# Patient Record
Sex: FEMALE | Race: BLACK OR AFRICAN AMERICAN | NOT HISPANIC OR LATINO | Employment: FULL TIME | URBAN - NONMETROPOLITAN AREA
[De-identification: names, ages, dates, MRNs, and addresses within clinical notes are randomized per-mention and may not be internally consistent; named-entity substitution may affect disease eponyms.]

---

## 2020-12-04 DIAGNOSIS — Z01.84 ANTIBODY RESPONSE EXAMINATION: ICD-10-CM

## 2021-01-28 DIAGNOSIS — M54.42 LUMBAGO WITH SCIATICA, LEFT SIDE: Primary | ICD-10-CM

## 2021-01-29 ENCOUNTER — HOSPITAL ENCOUNTER (OUTPATIENT)
Dept: RADIOLOGY | Facility: HOSPITAL | Age: 76
Discharge: HOME | End: 2021-01-29
Attending: INTERNAL MEDICINE
Payer: MEDICARE

## 2021-01-29 DIAGNOSIS — M54.42 LUMBAGO WITH SCIATICA, LEFT SIDE: ICD-10-CM

## 2021-01-29 PROCEDURE — 72148 MRI LUMBAR SPINE W/O DYE: CPT | Mod: TC

## 2021-02-04 ENCOUNTER — HOSPITAL ENCOUNTER (OUTPATIENT)
Dept: RADIOLOGY | Facility: HOSPITAL | Age: 76
Discharge: HOME | End: 2021-02-04
Attending: INTERNAL MEDICINE
Payer: MEDICARE

## 2021-02-04 DIAGNOSIS — N28.1 RENAL CYST: ICD-10-CM

## 2021-02-04 PROCEDURE — 76770 US EXAM ABDO BACK WALL COMP: CPT | Mod: TC

## 2021-02-08 PROBLEM — M72.2 PLANTAR FASCIITIS: Status: ACTIVE | Noted: 2021-02-08

## 2021-02-08 PROBLEM — E78.5 HYPERLIPEMIA: Status: ACTIVE | Noted: 2021-02-08

## 2021-02-08 PROBLEM — E11.9 DIABETES MELLITUS WITHOUT COMPLICATION: Status: ACTIVE | Noted: 2021-02-08

## 2021-02-08 PROBLEM — E66.9 OBESITY: Status: ACTIVE | Noted: 2021-02-08

## 2021-02-08 PROBLEM — E88.810 METABOLIC SYNDROME: Status: ACTIVE | Noted: 2021-02-08

## 2021-02-08 PROBLEM — I10 ESSENTIAL HYPERTENSION: Status: ACTIVE | Noted: 2021-02-08

## 2021-02-08 PROBLEM — R53.83 FATIGUE: Status: ACTIVE | Noted: 2021-02-08

## 2021-02-08 PROBLEM — M54.50 LUMBAGO: Status: ACTIVE | Noted: 2021-02-08

## 2021-02-08 PROBLEM — B02.9 SHINGLES: Status: ACTIVE | Noted: 2021-02-08

## 2021-02-08 PROBLEM — E63.0 ESSENTIAL FATTY ACID (EFA) DEFICIENCY: Status: ACTIVE | Noted: 2021-02-08

## 2021-02-08 PROBLEM — M12.9 ARTHROPATHY: Status: ACTIVE | Noted: 2021-02-08

## 2021-02-08 PROBLEM — E88.89 COENZYME Q DEFICIENCY: Status: ACTIVE | Noted: 2021-02-08

## 2021-02-09 ENCOUNTER — IMMUNIZATION (OUTPATIENT)
Dept: OBSTETRICS AND GYNECOLOGY | Facility: CLINIC | Age: 76
End: 2021-02-09
Payer: MEDICARE

## 2021-02-09 DIAGNOSIS — Z23 NEED FOR VACCINATION: Primary | ICD-10-CM

## 2021-02-09 PROBLEM — N28.1 RENAL CYST: Status: ACTIVE | Noted: 2021-02-09

## 2021-02-09 PROBLEM — G47.00 INSOMNIA: Status: ACTIVE | Noted: 2021-02-09

## 2021-02-09 PROBLEM — M54.16 LUMBAR RADICULOPATHY: Status: ACTIVE | Noted: 2021-02-08

## 2021-02-09 PROCEDURE — 91300 COVID-19, MRNA, LNP-S, PF, 30 MCG/0.3 ML DOSE VACCINE: CPT | Mod: PBBFAC | Performed by: ANESTHESIOLOGY

## 2021-03-02 ENCOUNTER — IMMUNIZATION (OUTPATIENT)
Dept: OBSTETRICS AND GYNECOLOGY | Facility: CLINIC | Age: 76
End: 2021-03-02
Payer: MEDICARE

## 2021-03-02 DIAGNOSIS — Z23 NEED FOR VACCINATION: Primary | ICD-10-CM

## 2021-03-02 PROCEDURE — 0002A COVID-19, MRNA, LNP-S, PF, 30 MCG/0.3 ML DOSE VACCINE: CPT | Mod: CV19,,, | Performed by: ANESTHESIOLOGY

## 2021-03-02 PROCEDURE — 0002A COVID-19, MRNA, LNP-S, PF, 30 MCG/0.3 ML DOSE VACCINE: ICD-10-PCS | Mod: CV19,,, | Performed by: ANESTHESIOLOGY

## 2021-03-02 PROCEDURE — 91300 COVID-19, MRNA, LNP-S, PF, 30 MCG/0.3 ML DOSE VACCINE: CPT | Mod: ,,, | Performed by: ANESTHESIOLOGY

## 2021-03-02 PROCEDURE — 91300 COVID-19, MRNA, LNP-S, PF, 30 MCG/0.3 ML DOSE VACCINE: ICD-10-PCS | Mod: ,,, | Performed by: ANESTHESIOLOGY

## 2021-05-20 ENCOUNTER — HOSPITAL ENCOUNTER (OUTPATIENT)
Dept: RADIOLOGY | Facility: HOSPITAL | Age: 76
Discharge: HOME | End: 2021-05-20
Attending: INTERNAL MEDICINE
Payer: MEDICARE

## 2021-05-20 DIAGNOSIS — M25.531 RIGHT WRIST PAIN: ICD-10-CM

## 2021-05-20 PROCEDURE — 73110 X-RAY EXAM OF WRIST: CPT | Mod: TC,RT

## 2021-06-04 LAB — HBA1C MFR BLD: 6.8 % (ref 4–6)

## 2021-06-29 PROBLEM — Z00.00 ROUTINE GENERAL MEDICAL EXAMINATION AT A HEALTH CARE FACILITY: Status: ACTIVE | Noted: 2021-06-29

## 2021-07-01 ENCOUNTER — PATIENT OUTREACH (OUTPATIENT)
Dept: ADMINISTRATIVE | Facility: HOSPITAL | Age: 76
End: 2021-07-01

## 2021-10-04 PROBLEM — Z00.00 ROUTINE GENERAL MEDICAL EXAMINATION AT A HEALTH CARE FACILITY: Status: RESOLVED | Noted: 2021-06-29 | Resolved: 2021-10-04

## 2022-05-04 PROBLEM — E55.9 VITAMIN D DEFICIENCY: Status: ACTIVE | Noted: 2022-05-04

## 2023-01-03 PROBLEM — J06.9 URI (UPPER RESPIRATORY INFECTION): Status: ACTIVE | Noted: 2023-01-03

## 2023-01-27 PROBLEM — Z00.00 WELLNESS EXAMINATION: Status: ACTIVE | Noted: 2023-01-27

## 2023-01-30 PROBLEM — M54.32 LEFT SCIATIC NERVE PAIN: Status: ACTIVE | Noted: 2023-01-30

## 2023-01-30 PROBLEM — J06.9 URI (UPPER RESPIRATORY INFECTION): Status: RESOLVED | Noted: 2023-01-03 | Resolved: 2023-01-30

## 2023-02-10 PROBLEM — M25.552 LEFT HIP PAIN: Status: RESOLVED | Noted: 2023-02-10 | Resolved: 2023-02-10

## 2023-02-10 PROBLEM — M25.552 LEFT HIP PAIN: Status: ACTIVE | Noted: 2023-02-10

## 2023-03-15 ENCOUNTER — CLINICAL SUPPORT (OUTPATIENT)
Dept: REHABILITATION | Facility: HOSPITAL | Age: 78
End: 2023-03-15
Attending: INTERNAL MEDICINE
Payer: MEDICARE

## 2023-03-15 DIAGNOSIS — M25.552 LEFT HIP PAIN: ICD-10-CM

## 2023-03-15 PROCEDURE — 97161 PT EVAL LOW COMPLEX 20 MIN: CPT

## 2023-03-15 NOTE — PLAN OF CARE
Physical Therapy Initial Evaluation     Name: Jaki [facility] Number: [id number]    Diagnosis:   Encounter Diagnosis   Name Primary?    Left hip pain      Physician: Jung Manjarrez Jr., *  Treatment Orders: PT Eval and Treat  Past Medical History:   Diagnosis Date    Diabetes     Hyperlipidemia     Hypertension     Obesity     Postherpetic neuralgia      Current Outpatient Medications   Medication Sig    amLODIPine (NORVASC) 5 MG tablet Take 1 tablet (5 mg total) by mouth once daily.    aspirin (ECOTRIN) 81 MG EC tablet Take 81 mg by mouth once daily.    dulaglutide (TRULICITY) 4.5 mg/0.5 mL pen injector Inject 4.5 mg into the skin every 7 days.    gabapentin (NEURONTIN) 100 MG capsule Take 1 capsule (100 mg total) by mouth 3 (three) times daily.    metFORMIN (GLUCOPHAGE) 500 MG tablet Take 1 tablet (500 mg total) by mouth 2 (two) times daily with meals.    olmesartan (BENICAR) 40 MG tablet Take 1 tablet (40 mg total) by mouth once daily.    promethazine (PHENERGAN) 25 MG tablet Take 1 tablet (25 mg total) by mouth 2 (two) times daily as needed for Nausea.    rosuvastatin (CRESTOR) 5 MG tablet Take 1 tablet (5 mg total) by mouth once daily.    traMADoL (ULTRAM) 50 mg tablet Take 1 tablet (50 mg total) by mouth every 12 (twelve) hours as needed for Pain.    traZODone (DESYREL) 50 MG tablet Take 1 tablet (50 mg total) by mouth every evening.     No current facility-administered medications for this visit.     Review of patient's allergies indicates:   Allergen Reactions    Hydrocodone     Opioids - morphine analogues     Oxycodone        Subjective     Patient states:  history of (L) buttock/hip pain with onset of approximately 1 month. Patient reports no known injury and pain has gradually increased in intensity, duration and frequency requiring her to seek treatment. Patient does report fall 2-3 month ago due to tripping on broken sidewalk but no  residual pain symptoms or injury. Patient reports she has more difficulty sitting than standing and cannot even tolerate a ride from here to [place] due to pain symptoms. Patient currently reports (L) buttock/hip pain 8/10 sitting and 5/10 standing. 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 No steps or stairs to enter home.   Sciatic nerve trouble a couple of years ago. Lumbar surgery 8 years ago.          Pts goals:  to return to PLOF without pain symptoms    Objective     Posture Alignment: decreased lordosis, decreased weight bearing on (L) LE/hip in sitting/standing, toe out of (B) feet  Palpation: Tenderness to palpation (L) hip/buttocks and proximal aspect of hamstring    Lumbar Range of Motion: % of available ROM   Degrees   Flexion 75     Extension 25     Left Side Bending 50   Right Side Bending 50   Left rotation   50   Right Rotation   50        Lower Extremity Strength  Right LE  Left LE    Knee extension: 4+/5 Knee extension: 4/5   Knee flexion: 4+/5 Knee flexion: 4/5   Hip flexion: 4+/5 Hip flexion: 3-/5   Hip extension:  4+/5 Hip extension: 3-/5   Hip abduction: 4+/5 Hip abduction: 3-/5   Hip adduction: 4+/5 Hip adduction 3-/5   Ankle dorsiflexion: 4+/5 Ankle dorsiflexion: 4+/5   Ankle plantarflexion: 4+/5 Ankle plantarflexion: 4+/5      Lower Extremity ROM (guarding (L) hip during AROM and PROM)  Right LE  Left LE    Knee extension: WNL Knee extension: WNL   Knee flexion: WNL Knee flexion: WNL   Hip flexion: WNL Hip flexion: 75% normal ROM   Hip extension:  WNL Hip extension: 75% normal ROM   Hip abduction: WNL Hip abduction: 75% normal ROM   Hip adduction: WNL Hip adduction 75% normal ROM   Ankle dorsiflexion: WNL Ankle dorsiflexion: WNL   Ankle plantarflexion: WNL Ankle plantarflexion: WNL     Dermatomes: Sensation: Light Touch: Intact  Myotomes: Intact    FLEXIBILITY: decreased  hamstring and piriformis flexibility    Special Tests:   Left Right   Mal + -   Negrito + -   Power + -     GAIT: Jaki ambulates with no assistive device with independently.     GAIT DEVIATIONS: Jaki displays antalgic gait pattern with toe out, wide HAO, decreased heel strike, decreased foot clearance, decreased weight shifting to (L) LE,     Pt/family was provided educational information, including: role of PT, goals for PT, scheduling - pt verbalized understanding. Discussed insurance limitations with pt.     LOWER EXTREMITY FUNCTIONAL SCALE  26/80         1. Any of your usual work, housework or school activities     2. Your usual hobbies, sporting       3. Getting in and out of tub        4. Walking between rooms        5. Putting on shoes or socks        6. Squatting          7. Lifting an object from the ground        8. Performing light activities around the home     9. Performing heavy activities around the home     10. Getting in and out of car        11. Walking 2 blocks         12.Walking a mile         13. Getting up and down 1 flight of stairs      14. Standing for 1 hour        15. Sitting for an hour         16. Running on even ground        17. Running on uneven ground       18. Making sharp turns when running fast      19. Hopping          20. Rolling over in bed          5x sit to stand: 16s  TUs    TREATMENT     Time In: 1400  Time Out: 1445    PT Evaluation Completed? Yes  Discussed Plan of Care with patient: Yes    Written Home Exercises Provided: please see  for details  Jaki demo good understanding of the education provided. Patient demo good return demo of skill of exercises.    Assessment     Patient could benefit from PT intervention to treat above stated deficits.   Pt prognosis is Good.  Pt will benefit from skilled outpatient physical therapy to address the above stated deficits, provide  pt/family education and to maximize pt's level of independence.     Medical necessity is demonstrated by the following IMPAIRMENTS/PROBLEMS:  1. Increased Pain  2. Decreased ROM  3. Decreased Core & LLE strength  4. Decreased Flexibility BLE  5. Decreased Tolerance to Functional Activities    Pt's spiritual, cultural and educational needs considered and pt agreeable to plan of care and goals as stated below:     Anticipated Barriers for physical therapy: none    Short Term GOALS: 4 weeks. Pt agrees with goals set.  1. Patient demonstrates independence with HEP.   2. Patient demonstrates independence with Postural Awareness.   3. Patient demonstrates independence with body mechanics.     Long Term GOALS: 8 weeks. Pt agrees with goals set.  1. Patient demonstrates increased (L) hip ROM to WNL to improve tolerance to functional activities.   2. Patient demonstrates increased strength BLE's to 4/5 or greater to improve tolerance to functional activities.   3. Patient demonstrates improved overall function per LEFS to 50 or greater.   4. Patient demonstrates decreased (L) LE pain symptoms to 2/10 at worst.  5. Patient demonstrates improvement in flexibility (L) LE to facilitate performance of functional activities.   6. Patient demonstrates decreased gait deviations.  7. Goals PRN.     PLAN     Outpatient physical therapy 2 times weekly to include: pt ed, hep, therapeutic exercises, neuromuscular re-education/ balance exercises, manual therapy, and modalities prn including but not limited to MHP, CP, electrical stimulation, ultrasound. Cont PT for  8 weeks. Pt may be seen by PTA as part of the rehabilitation team.   Certification dates: 3/15/2023-5/12/2023    Therapist: Camila Dan, PT  3/15/2023       MD Certification     [] I certify that I have reviewed this PT Evaluation   and I am in agreement with the Plan of Care.     MD:     Date:

## 2023-03-20 ENCOUNTER — TELEPHONE (OUTPATIENT)
Dept: REHABILITATION | Facility: HOSPITAL | Age: 78
End: 2023-03-20
Payer: MEDICARE

## 2023-03-21 ENCOUNTER — CLINICAL SUPPORT (OUTPATIENT)
Dept: REHABILITATION | Facility: HOSPITAL | Age: 78
End: 2023-03-21
Payer: MEDICARE

## 2023-03-21 DIAGNOSIS — M25.552 LEFT HIP PAIN: Primary | ICD-10-CM

## 2023-03-21 PROCEDURE — 97112 NEUROMUSCULAR REEDUCATION: CPT

## 2023-03-21 PROCEDURE — 97110 THERAPEUTIC EXERCISES: CPT

## 2023-03-21 NOTE — PROGRESS NOTES
"                                                    Physical Therapy Daily Note     Name: Jaki [facility] Number: [id number]  Diagnosis:   Encounter Diagnosis   Name Primary?    Left hip pain Yes     Physician: Jung Manjarrez Jr., *  Precautions: fall  Visit #: 1 of 10  PTA Visit #: 0  Time In: 1350  Time Out: 1448    Subjective     Pt reports: (L) hip feeling better since initial evaluation. Patient reports she has been performing HEP.   Pain Scale: Jaki rates pain on a scale of 0-10 to be 5 currently. Decreased (L) hip pain following treatment.     Objective     Jaki received individual therapeutic exercises, activities and neuro re-education techniques to develop strength, endurance, ROM, flexibility, posture, and core stabilization for 58 minutes including:  MHP x10 minutes to (L) hip prior to treatment per patient request.   Omnicycle (B) LE ortho 8W 10' 72/28.  4x30" HSS  4x30" Figure 4 stretch  3x10 glute sets 3"  3x10 marching supine with TA bracing  3x10 ball flexion supine  3x10 bridge  3x10 Abduction Supine  3x10 supine clams RTB  3x10 HS curls YTB    Jaki received the following manual therapy techniques: none    The patient received the following direct contact modalities after being cleared for contraindications:none    The patient received the following supervised modalities after being cleared for contradictions: see above    Written Home Exercises Provided: see   Education provided re: modalities, progression of therapy program  Jaki verbalized good understanding of education provided.   No spiritual or educational barriers to learning provided    Assessment     Patient tolerated treatment well. PT will continue to progress patient treatment as appropriate. Patient has made improvement with (L) hip pain since beginning therapy treatment.   This is a 77 y.o. female referred to outpatient physical therapy and presents with a medical diagnosis of L hip " pain and demonstrates limitations as described in the problem list. Pt prognosis is Excellent. Pt will continue to benefit from skilled outpatient physical therapy to address the deficits listed in the problem list, provide pt/family education and to maximize pt's level of independence in the home and community environment.     Goals as follows:  Short Term GOALS: 4 weeks. Pt agrees with goals set.  1. Patient demonstrates independence with HEP.   2. Patient demonstrates independence with Postural Awareness.   3. Patient demonstrates independence with body mechanics.      Long Term GOALS: 8 weeks. Pt agrees with goals set.  1. Patient demonstrates increased (L) hip ROM to WNL to improve tolerance to functional activities.   2. Patient demonstrates increased strength BLE's to 4/5 or greater to improve tolerance to functional activities.   3. Patient demonstrates improved overall function per LEFS to 50 or greater.   4. Patient demonstrates decreased (L) LE pain symptoms to 2/10 at worst.  5. Patient demonstrates improvement in flexibility (L) LE to facilitate performance of functional activities.   6. Patient demonstrates decreased gait deviations.  7. Goals PRN.         Plan     Continue with established Plan of Care towards PT goals.    Therapist: Camila Dan, PT  3/21/2023

## 2023-03-23 ENCOUNTER — CLINICAL SUPPORT (OUTPATIENT)
Dept: REHABILITATION | Facility: HOSPITAL | Age: 78
End: 2023-03-23
Payer: MEDICARE

## 2023-03-23 DIAGNOSIS — M25.552 LEFT HIP PAIN: Primary | ICD-10-CM

## 2023-03-23 PROCEDURE — 97110 THERAPEUTIC EXERCISES: CPT

## 2023-03-23 PROCEDURE — 97112 NEUROMUSCULAR REEDUCATION: CPT

## 2023-03-23 NOTE — PROGRESS NOTES
"                                                    Physical Therapy Daily Note     Name: Jaki [facility] Number: [id number]  Diagnosis:   Encounter Diagnosis   Name Primary?    Left hip pain Yes     Physician: Jung Manjarrez Jr., *  Precautions: fall  Visit #: 2 of 10  PTA Visit #: 0  Time In: 1400  Time Out: 1450    Subjective     Pt reports: no new complaints Patient reports she has been performing HEP.   Pain Scale: Jaki rates pain on a scale of 0-10 to be 2 currently.     Objective     Jaki received individual therapeutic exercises, activities and neuro re-education techniques to develop strength, endurance, ROM, flexibility, posture, and core stabilization for 50 minutes including:  MHP x10 minutes to (L) hip prior to treatment per patient request.   (B) LE bicycle 5 minutes steady revolutions  4x30" HSS  4x30" Figure 4 stretch  3x10 glute sets 3"  3x10 marching supine with TA bracing  3x10 ball flexion supine  3x10 bridge  3x10 Abduction Supine  3x10 supine clams GTB  3x10 HS curls RTB  +3x10x3" ball squeezes  +3x10 LAQ    Jaki received the following manual therapy techniques: none    The patient received the following direct contact modalities after being cleared for contraindications:none    The patient received the following supervised modalities after being cleared for contradictions: see above    Written Home Exercises Provided: see   Education provided re: modalities, progression of therapy program  Jaki verbalized good understanding of education provided.   No spiritual or educational barriers to learning provided    Assessment     Patient tolerated treatment well. PT will continue to progress patient treatment as appropriate. Continued improvement with (L) hip pain. Decreased antalgic gait pattern noted.   This is a 77 y.o. female referred to outpatient physical therapy and presents with a medical diagnosis of L hip pain and demonstrates limitations " as described in the problem list. Pt prognosis is Excellent. Pt will continue to benefit from skilled outpatient physical therapy to address the deficits listed in the problem list, provide pt/family education and to maximize pt's level of independence in the home and community environment.     Goals as follows:  Short Term GOALS: 4 weeks. Pt agrees with goals set.  1. Patient demonstrates independence with HEP.   2. Patient demonstrates independence with Postural Awareness.   3. Patient demonstrates independence with body mechanics.      Long Term GOALS: 8 weeks. Pt agrees with goals set.  1. Patient demonstrates increased (L) hip ROM to WNL to improve tolerance to functional activities.   2. Patient demonstrates increased strength BLE's to 4/5 or greater to improve tolerance to functional activities.   3. Patient demonstrates improved overall function per LEFS to 50 or greater.   4. Patient demonstrates decreased (L) LE pain symptoms to 2/10 at worst.  5. Patient demonstrates improvement in flexibility (L) LE to facilitate performance of functional activities.   6. Patient demonstrates decreased gait deviations.  7. Goals PRN.         Plan     Continue with established Plan of Care towards PT goals.    Therapist: Camila Dan, PT  3/23/2023

## 2023-03-30 ENCOUNTER — CLINICAL SUPPORT (OUTPATIENT)
Dept: REHABILITATION | Facility: HOSPITAL | Age: 78
End: 2023-03-30
Payer: MEDICARE

## 2023-03-30 DIAGNOSIS — M25.552 LEFT HIP PAIN: Primary | ICD-10-CM

## 2023-03-30 PROCEDURE — 97530 THERAPEUTIC ACTIVITIES: CPT

## 2023-03-30 PROCEDURE — 97110 THERAPEUTIC EXERCISES: CPT

## 2023-03-30 NOTE — PROGRESS NOTES
"                                                    Physical Therapy Daily Note     Name: Jaki [facility] Number: [id number]  Diagnosis:   Encounter Diagnosis   Name Primary?    Left hip pain Yes     Physician: Jung Manjarrez Jr., *  Precautions: fall  Visit #: 3 of 10  PTA Visit #: 0  Time In: 1502  Time Out: 1555    Subjective     Pt reports: no new complaints Patient reports she has been performing HEP.   Pain Scale: Jaki rates pain on a scale of 0-10 to be 0 currently. Patient just reports stiffness (L) hip.     Objective     Jaki received individual therapeutic exercises, activities and neuro re-education techniques to develop strength, endurance, ROM, flexibility, posture, and core stabilization for 53 minutes including:  MHP x5 minutes to (L) hip prior to treatment per patient request.   (B) LE bicycle 10 minutes steady revolutions  4x30" HSS  4x30" Figure 4 stretch  3x10 glute sets 3"  3x10 marching supine with TA bracing  3x10 ball flexion supine  3x10 bridge with adduction  3x10 Abduction Supine  +3x10 SLR supine  3x10 supine clams GTB  3x10 HS curls RTB  3x10 LAQ  +2x10 sit to stand  +3x10 standing hip flexion    Jaki received the following manual therapy techniques: none    The patient received the following direct contact modalities after being cleared for contraindications:none    The patient received the following supervised modalities after being cleared for contradictions: see above    Written Home Exercises Provided: see   Education provided re: modalities, progression of therapy program  Jaki verbalized good understanding of education provided.   No spiritual or educational barriers to learning provided    Assessment     Patient tolerated treatment well. PT will continue to progress patient treatment as appropriate. Patient overall reporting improvement in intensity, frequency and duration of (L) hip pain symptoms. Patient reports decreased " stiffness/pain following treatment.     This is a 77 y.o. female referred to outpatient physical therapy and presents with a medical diagnosis of L hip pain and demonstrates limitations as described in the problem list. Pt prognosis is Excellent. Pt will continue to benefit from skilled outpatient physical therapy to address the deficits listed in the problem list, provide pt/family education and to maximize pt's level of independence in the home and community environment.     Goals as follows:  Short Term GOALS: 4 weeks. Pt agrees with goals set.  1. Patient demonstrates independence with HEP.   2. Patient demonstrates independence with Postural Awareness.   3. Patient demonstrates independence with body mechanics.      Long Term GOALS: 8 weeks. Pt agrees with goals set.  1. Patient demonstrates increased (L) hip ROM to WNL to improve tolerance to functional activities.   2. Patient demonstrates increased strength BLE's to 4/5 or greater to improve tolerance to functional activities.   3. Patient demonstrates improved overall function per LEFS to 50 or greater.   4. Patient demonstrates decreased (L) LE pain symptoms to 2/10 at worst.  5. Patient demonstrates improvement in flexibility (L) LE to facilitate performance of functional activities.   6. Patient demonstrates decreased gait deviations.  7. Goals PRN.         Plan     Continue with established Plan of Care towards PT goals.    Therapist: Camila Dan, PT  3/30/2023

## 2023-04-03 ENCOUNTER — TELEPHONE (OUTPATIENT)
Dept: REHABILITATION | Facility: HOSPITAL | Age: 78
End: 2023-04-03
Payer: MEDICARE

## 2023-04-04 ENCOUNTER — TELEPHONE (OUTPATIENT)
Dept: REHABILITATION | Facility: HOSPITAL | Age: 78
End: 2023-04-04
Payer: MEDICARE

## 2023-04-04 ENCOUNTER — CLINICAL SUPPORT (OUTPATIENT)
Dept: REHABILITATION | Facility: HOSPITAL | Age: 78
End: 2023-04-04
Payer: MEDICARE

## 2023-04-04 DIAGNOSIS — M25.552 LEFT HIP PAIN: Primary | ICD-10-CM

## 2023-04-04 PROCEDURE — 97530 THERAPEUTIC ACTIVITIES: CPT

## 2023-04-04 PROCEDURE — 97112 NEUROMUSCULAR REEDUCATION: CPT

## 2023-04-04 PROCEDURE — 97110 THERAPEUTIC EXERCISES: CPT

## 2023-04-04 NOTE — PROGRESS NOTES
"                                                    Physical Therapy Daily Note     Name: Jaki [facility] Number: [id number]  Diagnosis:   No diagnosis found.    Physician: Jung Manjarrez Jr., *  Precautions: fall  Visit #: 4 of 10  PTA Visit #: 0  Time In: 1500  Time Out: 1547    Subjective     Pt reports: no complaints of (L) hip pain. Patient reports "I really feel like this is working."   Pain Scale: Jaik rates pain on a scale of 0-10 to be 0 currently.     Objective     Jaki received individual therapeutic exercises, activities and neuro re-education techniques to develop strength, endurance, ROM, flexibility, posture, and core stabilization for 47 minutes including:    (B) LE bicycle R2 10 minutes steady revolutions  4x30" HSS  4x30" Figure 4 stretch  3x10 glute sets 3"  3x10 marching supine with TA bracing  3x10 ball flexion supine RSB  3x10 bridge with adduction  3x10 Abduction Supine  3x10 SLR supine 1/2#  3x10 supine clams GTB  3x10 HS curls RTB  3x10 LAQ 1/2#  3x10 sit to stand from mat no UE  +3x10 standing hip flexion/abduction/extension  +3x10 lateral steps with RTB    Jaki received the following manual therapy techniques: none    The patient received the following direct contact modalities after being cleared for contraindications:none    The patient received the following supervised modalities after being cleared for contradictions: see above    Written Home Exercises Provided: see   Education provided re: modalities, progression of therapy program  Jaki verbalized good understanding of education provided.   No spiritual or educational barriers to learning provided    Assessment     Patient responding well to PT treatment with decline in symptoms. PT will continue to progress with POC as appropriate. Patient reports compliance with HEP. No pain after treatment and patient declined need for modalities.     This is a 77 y.o. female referred to " outpatient physical therapy and presents with a medical diagnosis of L hip pain and demonstrates limitations as described in the problem list. Pt prognosis is Excellent. Pt will continue to benefit from skilled outpatient physical therapy to address the deficits listed in the problem list, provide pt/family education and to maximize pt's level of independence in the home and community environment.     Goals as follows:  Short Term GOALS: 4 weeks. Pt agrees with goals set.  1. Patient demonstrates independence with HEP.   2. Patient demonstrates independence with Postural Awareness.   3. Patient demonstrates independence with body mechanics.      Long Term GOALS: 8 weeks. Pt agrees with goals set.  1. Patient demonstrates increased (L) hip ROM to WNL to improve tolerance to functional activities.   2. Patient demonstrates increased strength BLE's to 4/5 or greater to improve tolerance to functional activities.   3. Patient demonstrates improved overall function per LEFS to 50 or greater.   4. Patient demonstrates decreased (L) LE pain symptoms to 2/10 at worst.  5. Patient demonstrates improvement in flexibility (L) LE to facilitate performance of functional activities.   6. Patient demonstrates decreased gait deviations.  7. Goals PRN.         Plan     Continue with established Plan of Care towards PT goals.    Therapist: Camila Dan, PT  4/4/2023

## 2023-04-05 NOTE — PROGRESS NOTES
"                                                    Physical Therapy Daily Note     Name: Jaki [facility] Number: [id number]  Diagnosis:   No diagnosis found.    Physician: Jung Manjarrez Jr., *  Precautions: fall  Visit #: 5 of 10  PTA Visit #: 0  Time In: 1501  Time Out: 1551    Subjective     Pt reports: pain free today. Patient reports she hasn't felt this good in "months and years". Only reporting complaints of posterior thigh pain when sitting in a car especially for prolonged periods.   Pain Scale: Jaki rates pain on a scale of 0-10 to be 0 currently.     Objective     Jaki received individual therapeutic exercises, activities and neuro re-education techniques to develop strength, endurance, ROM, flexibility, posture, and core stabilization for 50 minutes including:    (B) LE bicycle R2 10 minutes steady revolutions  4x30" HSS  4x30" Figure 4 stretch  3x10 glute sets 3"  3x10 marching supine with TA bracing  3x10 ball flexion supine RSB  3x10 bridge with adduction  3x10 Abduction Supine  3x10 SLR supine 1#  3x10 supine clams BTB  3x10 HS curls GTB  3x10 LAQ 1#  3x10 sit to stand from mat no UE  3x10 standing hip flexion/abduction/extension  3x10 lateral steps with RTB  +3x10 calf raises/toe raises    Jaki received the following manual therapy techniques: none    The patient received the following direct contact modalities after being cleared for contraindications:none    The patient received the following supervised modalities after being cleared for contradictions: see above    Written Home Exercises Provided: see   Education provided re: modalities, progression of therapy program  Jaki verbalized good understanding of education provided.   No spiritual or educational barriers to learning provided    Assessment     Patient tolerated treatment well. Significant decrease in pain symptoms since beginning therapy treatment. PT will re-assess patient next visit " and if patient continues to be pain free will discharge from PT intervention.     This is a 77 y.o. female referred to outpatient physical therapy and presents with a medical diagnosis of L hip pain and demonstrates limitations as described in the problem list. Pt prognosis is Excellent. Pt will continue to benefit from skilled outpatient physical therapy to address the deficits listed in the problem list, provide pt/family education and to maximize pt's level of independence in the home and community environment.     Goals as follows:  Short Term GOALS: 4 weeks. Pt agrees with goals set.  1. Patient demonstrates independence with HEP.   2. Patient demonstrates independence with Postural Awareness.   3. Patient demonstrates independence with body mechanics.      Long Term GOALS: 8 weeks. Pt agrees with goals set.  1. Patient demonstrates increased (L) hip ROM to WNL to improve tolerance to functional activities.   2. Patient demonstrates increased strength BLE's to 4/5 or greater to improve tolerance to functional activities.   3. Patient demonstrates improved overall function per LEFS to 50 or greater.   4. Patient demonstrates decreased (L) LE pain symptoms to 2/10 at worst.  5. Patient demonstrates improvement in flexibility (L) LE to facilitate performance of functional activities.   6. Patient demonstrates decreased gait deviations.  7. Goals PRN.         Plan     Re-assessment with probable discharge as long as no significant changes.     Therapist: Camila Dan, PT  4/5/2023

## 2023-04-06 ENCOUNTER — TELEPHONE (OUTPATIENT)
Dept: REHABILITATION | Facility: HOSPITAL | Age: 78
End: 2023-04-06
Payer: MEDICARE

## 2023-04-06 ENCOUNTER — CLINICAL SUPPORT (OUTPATIENT)
Dept: REHABILITATION | Facility: HOSPITAL | Age: 78
End: 2023-04-06
Payer: MEDICARE

## 2023-04-06 DIAGNOSIS — M25.552 LEFT HIP PAIN: Primary | ICD-10-CM

## 2023-04-06 PROCEDURE — 97110 THERAPEUTIC EXERCISES: CPT

## 2023-04-06 PROCEDURE — 97112 NEUROMUSCULAR REEDUCATION: CPT

## 2023-04-13 ENCOUNTER — CLINICAL SUPPORT (OUTPATIENT)
Dept: REHABILITATION | Facility: HOSPITAL | Age: 78
End: 2023-04-13
Payer: MEDICARE

## 2023-04-13 DIAGNOSIS — M25.552 LEFT HIP PAIN: Primary | ICD-10-CM

## 2023-04-13 PROCEDURE — 97530 THERAPEUTIC ACTIVITIES: CPT

## 2023-04-13 PROCEDURE — 97110 THERAPEUTIC EXERCISES: CPT

## 2023-04-13 NOTE — PROGRESS NOTES
"                                                    Physical Therapy Daily Note/Discharge Summary     Name: Jaki [facility] Number: [id number]  Diagnosis:   Encounter Diagnosis   Name Primary?    Left hip pain Yes       Physician: Jung Manjarrez Jr., *  Precautions: fall  Visit #: 6 of 10  PTA Visit #: 0  Time In: 1500  Time Out: 1552    Subjective     Pt reports: no pain reported. No other complaints reported. Patient compliant with HEP.   Pain Scale: Jaki rates pain on a scale of 0-10 to be 0 currently.     Objective     Jaki received individual therapeutic exercises, activities and neuro re-education techniques to develop strength, endurance, ROM, flexibility, posture, and core stabilization for 52 minutes including:    (B) LE bicycle R2 10 minutes steady revolutions  4x30" HSS  4x30" Figure 4 stretch  3x10 glute sets 3"  3x10 marching supine with TA bracing  3x10 ball flexion supine RSB  3x10 bridge with adduction  3x10 Abduction Supine  3x10 SLR supine 1#  3x10 supine clams BTB  3x10 HS curls GTB  3x10 LAQ 1#  3x10 sit to stand from mat no UE  3x10 standing hip flexion/abduction/extension 1#  3x10 lateral steps with GTB  3x10 calf raises/toe raises    Jaki received the following manual therapy techniques: none    The patient received the following direct contact modalities after being cleared for contraindications:none    The patient received the following supervised modalities after being cleared for contradictions: see above    Written Home Exercises Provided: see   Education provided re: modalities, progression of therapy program  Jaki verbalized good understanding of education provided.   No spiritual or educational barriers to learning provided    Lower Extremity Strength  Right LE   Left LE     Knee extension: 4+/5 Knee extension: 4+/5   Knee flexion: 4+/5 Knee flexion: 4+/5   Hip flexion: 4+/5 Hip flexion: 4+/5   Hip extension:  4+/5 Hip extension: 4+/5 "   Hip abduction: 4+/5 Hip abduction: 4+/5   Hip adduction: 4+/5 Hip adduction 4+/5   Ankle dorsiflexion: 4+/5 Ankle dorsiflexion: 4+/5   Ankle plantarflexion: 4+/5 Ankle plantarflexion: 4+/5      Lower Extremity ROM   Right LE   Left LE     Knee extension: WNL Knee extension: WNL   Knee flexion: WNL Knee flexion: WNL   Hip flexion: WNL Hip flexion: WNL   Hip extension:  WNL Hip extension: WNL   Hip abduction: WNL Hip abduction: WNL   Hip adduction: WNL Hip adduction WNL   Ankle dorsiflexion: WNL Ankle dorsiflexion: WNL   Ankle plantarflexion: WNL Ankle plantarflexion: WNL       LOWER EXTREMITY FUNCTIONAL SCALE  76/80         1. Any of your usual work, housework or school activities   4/4  2. Your usual hobbies, sporting     4/4  3. Getting in and out of tub      4/4  4. Walking between rooms      4/4  5. Putting on shoes or socks      4/4  6. Squatting        3/4  7. Lifting an object from the ground      4/4  8. Performing light activities around the home   4/4  9. Performing heavy activities around the home   4/4  10. Getting in and out of car      4/4  11. Walking 2 blocks       4/4  12.Walking a mile       4/4  13. Getting up and down 1 flight of stairs    4/4  14. Standing for 1 hour      4/4  15. Sitting for an hour       4/4  16. Running on even ground      4/4  17. Running on uneven ground     3/4  18. Making sharp turns when running fast    3/4  19. Hopping        3/4  20. Rolling over in bed      4/4     Assessment   Patient has made significant improvements in pain, ROM, strength and functional activity tolerance since beginning PT treatment. PT feels patient could be discharged from PT intervention with continued performance of HEP.     Short Term GOALS: 4 weeks. Pt agrees with goals set.  1. Patient demonstrates independence with HEP.  Met CB 4/13/2023  2. Patient demonstrates independence with Postural Awareness. Met CB 4/13/2023  3. Patient demonstrates independence with body mechanics. Met CB  4/13/2023     Long Term GOALS: 8 weeks. Pt agrees with goals set.  1. Patient demonstrates increased (L) hip ROM to WNL to improve tolerance to functional activities. Met CB 4/13/2023  2. Patient demonstrates increased strength BLE's to 4/5 or greater to improve tolerance to functional activities. Met CB 4/13/2023  3. Patient demonstrates improved overall function per LEFS to 50 or greater. Met CB 4/13/2023  4. Patient demonstrates decreased (L) LE pain symptoms to 2/10 at worst.Met CB 4/13/2023  5. Patient demonstrates improvement in flexibility (L) LE to facilitate performance of functional activities. Met CB 4/13/2023  6. Patient demonstrates decreased gait deviations.Met CB 4/13/2023  7. Goals PRN.         Plan     Discharge from PT due to achieving all goals at this time.     Therapist: Camila Dan, PT  4/13/2023

## 2023-05-01 PROBLEM — Z00.00 WELLNESS EXAMINATION: Status: RESOLVED | Noted: 2023-01-27 | Resolved: 2023-05-01

## 2023-10-12 ENCOUNTER — LAB VISIT (OUTPATIENT)
Dept: LAB | Facility: HOSPITAL | Age: 78
End: 2023-10-12
Attending: INTERNAL MEDICINE
Payer: MEDICARE

## 2023-10-12 DIAGNOSIS — E88.89 COENZYME Q DEFICIENCY: ICD-10-CM

## 2023-10-12 DIAGNOSIS — E88.810 METABOLIC SYNDROME: ICD-10-CM

## 2023-10-12 DIAGNOSIS — E11.9 DIABETES MELLITUS WITHOUT COMPLICATION: ICD-10-CM

## 2023-10-12 DIAGNOSIS — I10 ESSENTIAL HYPERTENSION: ICD-10-CM

## 2023-10-12 DIAGNOSIS — Z79.899 ENCOUNTER FOR LONG-TERM (CURRENT) USE OF OTHER MEDICATIONS: ICD-10-CM

## 2023-10-12 DIAGNOSIS — E63.0 ESSENTIAL FATTY ACID (EFA) DEFICIENCY: ICD-10-CM

## 2023-10-12 DIAGNOSIS — E55.9 VITAMIN D DEFICIENCY: ICD-10-CM

## 2023-10-12 DIAGNOSIS — E78.2 MIXED HYPERLIPIDEMIA: ICD-10-CM

## 2023-10-12 DIAGNOSIS — E78.5 HYPERLIPIDEMIA, UNSPECIFIED HYPERLIPIDEMIA TYPE: ICD-10-CM

## 2023-10-12 DIAGNOSIS — Z00.00 WELLNESS EXAMINATION: ICD-10-CM

## 2023-10-12 LAB
ALBUMIN SERPL BCP-MCNC: 4 G/DL (ref 3.5–5.2)
ALBUMIN/CREAT UR: 35.3 UG/MG (ref 0–30)
ALP SERPL-CCNC: 77 U/L (ref 55–135)
ALT SERPL W/O P-5'-P-CCNC: 35 U/L (ref 10–44)
ANION GAP SERPL CALC-SCNC: 2 MMOL/L (ref 3–11)
AST SERPL-CCNC: 18 U/L (ref 10–40)
BASOPHILS # BLD AUTO: 0.02 K/UL (ref 0–0.2)
BASOPHILS NFR BLD: 0.3 % (ref 0–1.9)
BILIRUB SERPL-MCNC: 0.6 MG/DL (ref 0.1–1)
BUN SERPL-MCNC: 18 MG/DL (ref 8–23)
CALCIUM SERPL-MCNC: 9.4 MG/DL (ref 8.7–10.5)
CHLORIDE SERPL-SCNC: 106 MMOL/L (ref 95–110)
CHOLEST SERPL-MCNC: 160 MG/DL (ref 120–199)
CHOLEST/HDLC SERPL: 1.9 {RATIO} (ref 2–5)
CO2 SERPL-SCNC: 30 MMOL/L (ref 23–29)
CREAT SERPL-MCNC: 0.9 MG/DL (ref 0.5–1.4)
CREAT UR-MCNC: 80.5 MG/DL (ref 15–325)
CRP SERPL-MCNC: <0.29 MG/DL (ref 0–0.75)
DIFFERENTIAL METHOD: NORMAL
EOSINOPHIL # BLD AUTO: 0.1 K/UL (ref 0–0.5)
EOSINOPHIL NFR BLD: 2.4 % (ref 0–8)
ERYTHROCYTE [DISTWIDTH] IN BLOOD BY AUTOMATED COUNT: 12.9 % (ref 11.5–14.5)
EST. GFR  (NO RACE VARIABLE): >60 ML/MIN/1.73 M^2
ESTIMATED AVG GLUCOSE: 128 MG/DL (ref 68–131)
FOLATE SERPL-MCNC: 23.3 NG/ML (ref 4–24)
GLUCOSE SERPL-MCNC: 115 MG/DL (ref 70–110)
HBA1C MFR BLD: 6.1 % (ref 4–5.6)
HCT VFR BLD AUTO: 44.7 % (ref 37–48.5)
HDLC SERPL-MCNC: 83 MG/DL (ref 40–75)
HDLC SERPL: 51.9 % (ref 20–50)
HGB BLD-MCNC: 14.4 G/DL (ref 12–16)
IMM GRANULOCYTES # BLD AUTO: 0.02 K/UL (ref 0–0.04)
IMM GRANULOCYTES NFR BLD AUTO: 0.3 % (ref 0–0.5)
LDLC SERPL CALC-MCNC: 50.2 MG/DL (ref 63–159)
LYMPHOCYTES # BLD AUTO: 2.2 K/UL (ref 1–4.8)
LYMPHOCYTES NFR BLD: 36.5 % (ref 18–48)
MAGNESIUM SERPL-MCNC: 1.9 MG/DL (ref 1.6–2.6)
MCH RBC QN AUTO: 30.1 PG (ref 27–31)
MCHC RBC AUTO-ENTMCNC: 32.2 G/DL (ref 32–36)
MCV RBC AUTO: 93 FL (ref 82–98)
MICROALBUMIN UR DL<=1MG/L-MCNC: 28.4 MG/L
MONOCYTES # BLD AUTO: 0.5 K/UL (ref 0.3–1)
MONOCYTES NFR BLD: 8.1 % (ref 4–15)
NEUTROPHILS # BLD AUTO: 3.1 K/UL (ref 1.8–7.7)
NEUTROPHILS NFR BLD: 52.4 % (ref 38–73)
NONHDLC SERPL-MCNC: 77 MG/DL
NRBC BLD-RTO: 0 /100 WBC
PLATELET # BLD AUTO: 237 K/UL (ref 150–450)
PMV BLD AUTO: 9.7 FL (ref 9.2–12.9)
POTASSIUM SERPL-SCNC: 4.4 MMOL/L (ref 3.5–5.1)
PROT SERPL-MCNC: 8.6 G/DL (ref 6–8.4)
RBC # BLD AUTO: 4.79 M/UL (ref 4–5.4)
SODIUM SERPL-SCNC: 138 MMOL/L (ref 136–145)
T4 FREE SERPL-MCNC: 0.94 NG/DL (ref 0.71–1.51)
TRIGL SERPL-MCNC: 134 MG/DL (ref 30–150)
TSH SERPL DL<=0.005 MIU/L-ACNC: 2.22 UIU/ML (ref 0.4–4)
VIT B12 SERPL-MCNC: 509 PG/ML (ref 210–950)
WBC # BLD AUTO: 5.89 K/UL (ref 3.9–12.7)

## 2023-10-12 PROCEDURE — 83036 HEMOGLOBIN GLYCOSYLATED A1C: CPT | Performed by: INTERNAL MEDICINE

## 2023-10-12 PROCEDURE — 83525 ASSAY OF INSULIN: CPT | Performed by: INTERNAL MEDICINE

## 2023-10-12 PROCEDURE — 82172 ASSAY OF APOLIPOPROTEIN: CPT | Performed by: INTERNAL MEDICINE

## 2023-10-12 PROCEDURE — 84443 ASSAY THYROID STIM HORMONE: CPT | Performed by: INTERNAL MEDICINE

## 2023-10-12 PROCEDURE — 82607 VITAMIN B-12: CPT | Performed by: INTERNAL MEDICINE

## 2023-10-12 PROCEDURE — 82043 UR ALBUMIN QUANTITATIVE: CPT | Performed by: INTERNAL MEDICINE

## 2023-10-12 PROCEDURE — 82652 VIT D 1 25-DIHYDROXY: CPT | Performed by: INTERNAL MEDICINE

## 2023-10-12 PROCEDURE — 36415 COLL VENOUS BLD VENIPUNCTURE: CPT | Performed by: INTERNAL MEDICINE

## 2023-10-12 PROCEDURE — 80053 COMPREHEN METABOLIC PANEL: CPT | Performed by: INTERNAL MEDICINE

## 2023-10-12 PROCEDURE — 80061 LIPID PANEL: CPT | Performed by: INTERNAL MEDICINE

## 2023-10-12 PROCEDURE — 83735 ASSAY OF MAGNESIUM: CPT | Performed by: INTERNAL MEDICINE

## 2023-10-12 PROCEDURE — 84439 ASSAY OF FREE THYROXINE: CPT | Performed by: INTERNAL MEDICINE

## 2023-10-12 PROCEDURE — 85025 COMPLETE CBC W/AUTO DIFF WBC: CPT | Performed by: INTERNAL MEDICINE

## 2023-10-12 PROCEDURE — 86140 C-REACTIVE PROTEIN: CPT | Performed by: INTERNAL MEDICINE

## 2023-10-12 PROCEDURE — 83698 ASSAY LIPOPROTEIN PLA2: CPT | Performed by: INTERNAL MEDICINE

## 2023-10-12 PROCEDURE — 82746 ASSAY OF FOLIC ACID SERUM: CPT | Performed by: INTERNAL MEDICINE

## 2023-10-13 LAB
INSULIN COLLECTION INTERVAL: NORMAL
INSULIN SERPL-ACNC: 21.4 UU/ML

## 2023-10-14 LAB — APO B SERPL-MCNC: 60 MG/DL

## 2023-10-16 LAB — 1,25(OH)2D3 SERPL-MCNC: 40 PG/ML (ref 20–79)

## 2023-10-20 LAB — LP-PLA2 SERPL-CCNC: 65 NMOL/MIN/ML

## 2024-02-29 ENCOUNTER — LAB VISIT (OUTPATIENT)
Dept: LAB | Facility: HOSPITAL | Age: 79
End: 2024-02-29
Attending: INTERNAL MEDICINE
Payer: MEDICARE

## 2024-02-29 DIAGNOSIS — E55.9 VITAMIN D DEFICIENCY: ICD-10-CM

## 2024-02-29 DIAGNOSIS — I10 ESSENTIAL HYPERTENSION: ICD-10-CM

## 2024-02-29 DIAGNOSIS — Z79.899 ENCOUNTER FOR LONG-TERM (CURRENT) USE OF OTHER MEDICATIONS: ICD-10-CM

## 2024-02-29 DIAGNOSIS — E11.69 TYPE 2 DIABETES MELLITUS WITH OTHER SPECIFIED COMPLICATION, WITHOUT LONG-TERM CURRENT USE OF INSULIN: ICD-10-CM

## 2024-02-29 DIAGNOSIS — E11.9 DIABETES MELLITUS WITHOUT COMPLICATION: ICD-10-CM

## 2024-02-29 DIAGNOSIS — E78.41 ELEVATED LIPOPROTEIN(A): ICD-10-CM

## 2024-02-29 DIAGNOSIS — Z00.00 WELLNESS EXAMINATION: ICD-10-CM

## 2024-02-29 DIAGNOSIS — E78.2 MIXED HYPERLIPIDEMIA: ICD-10-CM

## 2024-02-29 DIAGNOSIS — E63.0 ESSENTIAL FATTY ACID (EFA) DEFICIENCY: ICD-10-CM

## 2024-02-29 DIAGNOSIS — E78.5 HYPERLIPIDEMIA, UNSPECIFIED HYPERLIPIDEMIA TYPE: ICD-10-CM

## 2024-02-29 DIAGNOSIS — E88.810 METABOLIC SYNDROME: ICD-10-CM

## 2024-02-29 DIAGNOSIS — E66.9 CLASS 2 OBESITY WITH BODY MASS INDEX (BMI) OF 35.0 TO 35.9 IN ADULT, UNSPECIFIED OBESITY TYPE, UNSPECIFIED WHETHER SERIOUS COMORBIDITY PRESENT: ICD-10-CM

## 2024-02-29 DIAGNOSIS — E88.89 COENZYME Q DEFICIENCY: ICD-10-CM

## 2024-02-29 LAB
ALBUMIN SERPL BCP-MCNC: 4.1 G/DL (ref 3.5–5.2)
ALBUMIN/CREAT UR: 12.1 UG/MG (ref 0–30)
ALP SERPL-CCNC: 69 U/L (ref 55–135)
ALT SERPL W/O P-5'-P-CCNC: 22 U/L (ref 10–44)
ANION GAP SERPL CALC-SCNC: 1 MMOL/L (ref 3–11)
AST SERPL-CCNC: 18 U/L (ref 10–40)
BASOPHILS # BLD AUTO: 0.03 K/UL (ref 0–0.2)
BASOPHILS NFR BLD: 0.7 % (ref 0–1.9)
BILIRUB SERPL-MCNC: 0.9 MG/DL (ref 0.1–1)
BUN SERPL-MCNC: 23 MG/DL (ref 8–23)
CALCIUM SERPL-MCNC: 9.5 MG/DL (ref 8.7–10.5)
CHLORIDE SERPL-SCNC: 108 MMOL/L (ref 95–110)
CHOLEST SERPL-MCNC: 129 MG/DL (ref 120–199)
CHOLEST/HDLC SERPL: 1.6 {RATIO} (ref 2–5)
CO2 SERPL-SCNC: 28 MMOL/L (ref 23–29)
CREAT SERPL-MCNC: 1.3 MG/DL (ref 0.5–1.4)
CREAT UR-MCNC: 238 MG/DL (ref 15–325)
CRP SERPL-MCNC: <0.29 MG/DL (ref 0–0.75)
DIFFERENTIAL METHOD BLD: NORMAL
EOSINOPHIL # BLD AUTO: 0.1 K/UL (ref 0–0.5)
EOSINOPHIL NFR BLD: 2.9 % (ref 0–8)
ERYTHROCYTE [DISTWIDTH] IN BLOOD BY AUTOMATED COUNT: 12.8 % (ref 11.5–14.5)
EST. GFR  (NO RACE VARIABLE): 42.1 ML/MIN/1.73 M^2
ESTIMATED AVG GLUCOSE: 128 MG/DL (ref 68–131)
FOLATE SERPL-MCNC: 22.1 NG/ML (ref 4–24)
GLUCOSE SERPL-MCNC: 111 MG/DL (ref 70–110)
HBA1C MFR BLD: 6.1 % (ref 4–5.6)
HCT VFR BLD AUTO: 41.5 % (ref 37–48.5)
HDLC SERPL-MCNC: 81 MG/DL (ref 40–75)
HDLC SERPL: 62.8 % (ref 20–50)
HGB BLD-MCNC: 13.4 G/DL (ref 12–16)
IMM GRANULOCYTES # BLD AUTO: 0.01 K/UL (ref 0–0.04)
IMM GRANULOCYTES NFR BLD AUTO: 0.2 % (ref 0–0.5)
INSULIN COLLECTION INTERVAL: 0
INSULIN SERPL-ACNC: 29.7 UU/ML
LDLC SERPL CALC-MCNC: 31.4 MG/DL (ref 63–159)
LYMPHOCYTES # BLD AUTO: 1.8 K/UL (ref 1–4.8)
LYMPHOCYTES NFR BLD: 39.1 % (ref 18–48)
MAGNESIUM SERPL-MCNC: 2 MG/DL (ref 1.6–2.6)
MCH RBC QN AUTO: 29.5 PG (ref 27–31)
MCHC RBC AUTO-ENTMCNC: 32.3 G/DL (ref 32–36)
MCV RBC AUTO: 91 FL (ref 82–98)
MICROALBUMIN UR DL<=1MG/L-MCNC: 28.7 MG/L
MONOCYTES # BLD AUTO: 0.5 K/UL (ref 0.3–1)
MONOCYTES NFR BLD: 10.3 % (ref 4–15)
NEUTROPHILS # BLD AUTO: 2.1 K/UL (ref 1.8–7.7)
NEUTROPHILS NFR BLD: 46.8 % (ref 38–73)
NONHDLC SERPL-MCNC: 48 MG/DL
NRBC BLD-RTO: 0 /100 WBC
PLATELET # BLD AUTO: 214 K/UL (ref 150–450)
PMV BLD AUTO: 9.4 FL (ref 9.2–12.9)
POTASSIUM SERPL-SCNC: 4.6 MMOL/L (ref 3.5–5.1)
PROT SERPL-MCNC: 8.3 G/DL (ref 6–8.4)
RBC # BLD AUTO: 4.55 M/UL (ref 4–5.4)
SODIUM SERPL-SCNC: 137 MMOL/L (ref 136–145)
TRIGL SERPL-MCNC: 83 MG/DL (ref 30–150)
VIT B12 SERPL-MCNC: 503 PG/ML (ref 210–950)
WBC # BLD AUTO: 4.47 K/UL (ref 3.9–12.7)

## 2024-02-29 PROCEDURE — 82607 VITAMIN B-12: CPT | Performed by: INTERNAL MEDICINE

## 2024-02-29 PROCEDURE — 80061 LIPID PANEL: CPT | Performed by: INTERNAL MEDICINE

## 2024-02-29 PROCEDURE — 83735 ASSAY OF MAGNESIUM: CPT | Performed by: INTERNAL MEDICINE

## 2024-02-29 PROCEDURE — 83036 HEMOGLOBIN GLYCOSYLATED A1C: CPT | Performed by: INTERNAL MEDICINE

## 2024-02-29 PROCEDURE — 86140 C-REACTIVE PROTEIN: CPT | Performed by: INTERNAL MEDICINE

## 2024-02-29 PROCEDURE — 82652 VIT D 1 25-DIHYDROXY: CPT | Performed by: INTERNAL MEDICINE

## 2024-02-29 PROCEDURE — 83525 ASSAY OF INSULIN: CPT | Performed by: INTERNAL MEDICINE

## 2024-02-29 PROCEDURE — 82746 ASSAY OF FOLIC ACID SERUM: CPT | Performed by: INTERNAL MEDICINE

## 2024-02-29 PROCEDURE — 85025 COMPLETE CBC W/AUTO DIFF WBC: CPT | Performed by: INTERNAL MEDICINE

## 2024-02-29 PROCEDURE — 80053 COMPREHEN METABOLIC PANEL: CPT | Performed by: INTERNAL MEDICINE

## 2024-02-29 PROCEDURE — 82043 UR ALBUMIN QUANTITATIVE: CPT | Performed by: INTERNAL MEDICINE

## 2024-03-04 LAB — 1,25(OH)2D3 SERPL-MCNC: 45 PG/ML (ref 20–79)

## 2024-04-15 PROBLEM — E11.69 TYPE 2 DIABETES MELLITUS WITH OTHER SPECIFIED COMPLICATION, WITHOUT LONG-TERM CURRENT USE OF INSULIN: Status: ACTIVE | Noted: 2024-04-15

## 2024-04-15 PROBLEM — F43.0 ACUTE STRESS REACTION: Status: ACTIVE | Noted: 2024-04-15

## 2024-05-17 ENCOUNTER — HOSPITAL ENCOUNTER (EMERGENCY)
Facility: HOSPITAL | Age: 79
Discharge: HOME | End: 2024-05-17
Attending: EMERGENCY MEDICINE
Payer: MEDICARE

## 2024-05-17 VITALS
RESPIRATION RATE: 17 BRPM | SYSTOLIC BLOOD PRESSURE: 175 MMHG | OXYGEN SATURATION: 99 % | HEIGHT: 64 IN | HEART RATE: 80 BPM | BODY MASS INDEX: 31.1 KG/M2 | WEIGHT: 182.19 LBS | TEMPERATURE: 98 F | DIASTOLIC BLOOD PRESSURE: 87 MMHG

## 2024-05-17 DIAGNOSIS — T14.8XXA MUSCLE STRAIN: Primary | ICD-10-CM

## 2024-05-17 DIAGNOSIS — M79.10 MUSCLE PAIN: ICD-10-CM

## 2024-05-17 LAB — POCT GLUCOSE: 107 MG/DL (ref 70–110)

## 2024-05-17 PROCEDURE — 99284 EMERGENCY DEPT VISIT MOD MDM: CPT | Mod: 25

## 2024-05-17 PROCEDURE — 82962 GLUCOSE BLOOD TEST: CPT

## 2024-05-17 PROCEDURE — 96372 THER/PROPH/DIAG INJ SC/IM: CPT | Performed by: EMERGENCY MEDICINE

## 2024-05-17 PROCEDURE — 63600175 PHARM REV CODE 636 W HCPCS: Performed by: EMERGENCY MEDICINE

## 2024-05-17 PROCEDURE — 25000003 PHARM REV CODE 250: Performed by: EMERGENCY MEDICINE

## 2024-05-17 RX ORDER — METHOCARBAMOL 500 MG/1
1000 TABLET, FILM COATED ORAL 3 TIMES DAILY
Qty: 30 TABLET | Refills: 0 | Status: SHIPPED | OUTPATIENT
Start: 2024-05-17 | End: 2024-05-22

## 2024-05-17 RX ORDER — METHOCARBAMOL 500 MG/1
1500 TABLET, FILM COATED ORAL
Status: COMPLETED | OUTPATIENT
Start: 2024-05-17 | End: 2024-05-17

## 2024-05-17 RX ORDER — KETOROLAC TROMETHAMINE 30 MG/ML
30 INJECTION, SOLUTION INTRAMUSCULAR; INTRAVENOUS
Status: COMPLETED | OUTPATIENT
Start: 2024-05-17 | End: 2024-05-17

## 2024-05-17 RX ADMIN — KETOROLAC TROMETHAMINE 30 MG: 30 INJECTION, SOLUTION INTRAMUSCULAR at 07:05

## 2024-05-17 RX ADMIN — METHOCARBAMOL 1500 MG: 500 TABLET ORAL at 07:05

## 2024-05-17 NOTE — Clinical Note
"Jaki"Jaki" Washington was seen and treated in our emergency department on 5/17/2024.  She may return to work on 05/18/2024.       If you have any questions or concerns, please don't hesitate to call.      Shalonda GONZALEZ    "

## 2024-05-17 NOTE — ED PROVIDER NOTES
"Encounter Date: 5/17/2024       History     Chief Complaint   Patient presents with    Muscle Pain     Patient reports she woke up this morning with a "cramping" behind left knee. Denies any known injury.      78-year-old female complaining of left hamstring muscle pain after walking to work yesterday, began this morning, states her muscle is sore in cramping.  History of this in the past.  Not ill appearing.  No swelling.  No other trauma.  Alert oriented x4, GCS is 15      Review of patient's allergies indicates:   Allergen Reactions    Hydrocodone Swelling    Opioids - morphine analogues Swelling    Oxycodone Swelling     Past Medical History:   Diagnosis Date    Diabetes     Hyperlipidemia     Hypertension     Obesity     Postherpetic neuralgia      Past Surgical History:   Procedure Laterality Date    APPENDECTOMY      COLONOSCOPY  2015    LUMBAR SPINE SURGERY       Family History   Problem Relation Name Age of Onset    Hypertension Mother      Hyperlipidemia Mother      Diabetes Mother      Hypertension Father      Hyperlipidemia Father      Diabetes Father      Atrial fibrillation Sister      Breast cancer Sister       Social History     Tobacco Use    Smoking status: Never     Review of Systems   Constitutional:  Negative for fever.   HENT:  Negative for sore throat.    Respiratory:  Negative for shortness of breath.    Cardiovascular:  Negative for chest pain.   Gastrointestinal:  Negative for nausea.   Genitourinary:  Negative for dysuria.   Musculoskeletal:  Negative for back pain.   Skin:  Negative for rash.   Neurological:  Negative for weakness.   Hematological:  Does not bruise/bleed easily.   All other systems reviewed and are negative.      Physical Exam     Initial Vitals [05/17/24 0724]   BP Pulse Resp Temp SpO2   (!) 175/87 80 17 98.2 °F (36.8 °C) 99 %      MAP       --         Physical Exam    Nursing note and vitals reviewed.  Constitutional: She appears well-developed and well-nourished. She " is not diaphoretic. No distress.   HENT:   Head: Normocephalic and atraumatic.   Mouth/Throat: Oropharynx is clear and moist.   Eyes: Conjunctivae and EOM are normal. Pupils are equal, round, and reactive to light.   Neck: Neck supple.   Normal range of motion.  Cardiovascular:  Normal rate, regular rhythm, normal heart sounds and intact distal pulses.           No murmur heard.  Pulmonary/Chest: Breath sounds normal. No respiratory distress. She has no wheezes. She has no rhonchi. She has no rales. She exhibits no tenderness.   Abdominal: Abdomen is soft. Bowel sounds are normal.   Musculoskeletal:         General: Tenderness present. No edema. Normal range of motion.      Cervical back: Normal range of motion and neck supple.      Comments: Minimal tenderness noted to posterior left hamstring, no swelling, no signs of DVT, neurovascularly intact throughout     Neurological: She is alert and oriented to person, place, and time. She has normal strength. No cranial nerve deficit. GCS score is 15. GCS eye subscore is 4. GCS verbal subscore is 5. GCS motor subscore is 6.   Skin: Skin is warm and dry. Capillary refill takes less than 2 seconds.         ED Course   Procedures  Labs Reviewed   POCT GLUCOSE MONITORING CONTINUOUS          Imaging Results    None          Medications   ketorolac injection 30 mg (has no administration in time range)   methocarbamoL tablet 1,500 mg (has no administration in time range)     Medical Decision Making  Risk  Prescription drug management.                          Medical Decision Making:   Differential Diagnosis:   Muscle strain, muscle pain, muscle cramps             Clinical Impression:  Final diagnoses:  [T14.8XXA] Muscle strain (Primary)  [M79.10] Muscle pain          ED Disposition Condition    Discharge Stable          ED Prescriptions       Medication Sig Dispense Start Date End Date Auth. Provider    methocarbamoL (ROBAXIN) 500 MG Tab Take 2 tablets (1,000 mg total) by mouth  3 (three) times daily. for 5 days 30 tablet 5/17/2024 5/22/2024 Edison Larkin MD          Follow-up Information       Follow up With Specialties Details Why Contact Info Additional Information    Primary care physician  In 2 days       [facility] Emergency Department Emergency Medicine  As needed [facility] [facility] [address]  [phone] Floor 1             Edison Larkin MD  05/17/24 0760

## 2024-05-17 NOTE — Clinical Note
"Jaki"Jaki" Washington was seen and treated in our emergency department on 5/17/2024.  She may return to work on 05/19/2024.       If you have any questions or concerns, please don't hesitate to call.      Shalonda GONZALEZ    "

## 2024-07-15 PROBLEM — Z00.00 ROUTINE GENERAL MEDICAL EXAMINATION AT A HEALTH CARE FACILITY: Status: RESOLVED | Noted: 2021-06-29 | Resolved: 2024-07-15

## 2024-07-16 ENCOUNTER — LAB VISIT (OUTPATIENT)
Dept: LAB | Facility: HOSPITAL | Age: 79
End: 2024-07-16
Attending: INTERNAL MEDICINE
Payer: MEDICARE

## 2024-07-16 DIAGNOSIS — E78.2 MIXED HYPERLIPIDEMIA: ICD-10-CM

## 2024-07-16 DIAGNOSIS — E88.89 COENZYME Q DEFICIENCY: ICD-10-CM

## 2024-07-16 DIAGNOSIS — E55.9 VITAMIN D DEFICIENCY: ICD-10-CM

## 2024-07-16 DIAGNOSIS — E11.59 HYPERTENSION ASSOCIATED WITH TYPE 2 DIABETES MELLITUS: ICD-10-CM

## 2024-07-16 DIAGNOSIS — E63.0 ESSENTIAL FATTY ACID (EFA) DEFICIENCY: ICD-10-CM

## 2024-07-16 DIAGNOSIS — I15.2 HYPERTENSION ASSOCIATED WITH TYPE 2 DIABETES MELLITUS: ICD-10-CM

## 2024-07-16 DIAGNOSIS — E11.69 TYPE 2 DIABETES MELLITUS WITH OTHER SPECIFIED COMPLICATION, WITHOUT LONG-TERM CURRENT USE OF INSULIN: ICD-10-CM

## 2024-07-16 DIAGNOSIS — Z79.899 ENCOUNTER FOR LONG-TERM (CURRENT) USE OF OTHER MEDICATIONS: ICD-10-CM

## 2024-07-16 DIAGNOSIS — E88.810 METABOLIC SYNDROME: ICD-10-CM

## 2024-07-16 LAB
ALBUMIN SERPL BCP-MCNC: 4.1 G/DL (ref 3.5–5.2)
ALBUMIN/CREAT UR: 12.2 UG/MG (ref 0–30)
ALP SERPL-CCNC: 70 U/L (ref 55–135)
ALT SERPL W/O P-5'-P-CCNC: 24 U/L (ref 10–44)
ANION GAP SERPL CALC-SCNC: 9 MMOL/L (ref 3–11)
AST SERPL-CCNC: 18 U/L (ref 10–40)
BASOPHILS # BLD AUTO: 0.03 K/UL (ref 0–0.2)
BASOPHILS NFR BLD: 0.6 % (ref 0–1.9)
BILIRUB SERPL-MCNC: 0.6 MG/DL (ref 0.1–1)
BUN SERPL-MCNC: 20 MG/DL (ref 8–23)
CALCIUM SERPL-MCNC: 9.2 MG/DL (ref 8.7–10.5)
CHLORIDE SERPL-SCNC: 102 MMOL/L (ref 95–110)
CHOLEST SERPL-MCNC: 135 MG/DL (ref 120–199)
CHOLEST/HDLC SERPL: 1.8 {RATIO} (ref 2–5)
CO2 SERPL-SCNC: 27 MMOL/L (ref 23–29)
CREAT SERPL-MCNC: 1 MG/DL (ref 0.5–1.4)
CREAT UR-MCNC: 89.3 MG/DL (ref 15–325)
DIFFERENTIAL METHOD BLD: NORMAL
EOSINOPHIL # BLD AUTO: 0.2 K/UL (ref 0–0.5)
EOSINOPHIL NFR BLD: 4.5 % (ref 0–8)
ERYTHROCYTE [DISTWIDTH] IN BLOOD BY AUTOMATED COUNT: 13.3 % (ref 11.5–14.5)
EST. GFR  (NO RACE VARIABLE): 57.7 ML/MIN/1.73 M^2
ESTIMATED AVG GLUCOSE: 108 MG/DL (ref 68–131)
GLUCOSE SERPL-MCNC: 94 MG/DL (ref 70–110)
HBA1C MFR BLD: 5.4 % (ref 4–5.6)
HCT VFR BLD AUTO: 41.1 % (ref 37–48.5)
HDLC SERPL-MCNC: 77 MG/DL (ref 40–75)
HDLC SERPL: 57 % (ref 20–50)
HGB BLD-MCNC: 13.4 G/DL (ref 12–16)
IMM GRANULOCYTES # BLD AUTO: 0 K/UL (ref 0–0.04)
IMM GRANULOCYTES NFR BLD AUTO: 0 % (ref 0–0.5)
LDLC SERPL CALC-MCNC: 41.4 MG/DL (ref 63–159)
LYMPHOCYTES # BLD AUTO: 2.4 K/UL (ref 1–4.8)
LYMPHOCYTES NFR BLD: 46.9 % (ref 18–48)
MCH RBC QN AUTO: 30 PG (ref 27–31)
MCHC RBC AUTO-ENTMCNC: 32.6 G/DL (ref 32–36)
MCV RBC AUTO: 92 FL (ref 82–98)
MICROALBUMIN UR DL<=1MG/L-MCNC: 10.9 MG/L
MONOCYTES # BLD AUTO: 0.5 K/UL (ref 0.3–1)
MONOCYTES NFR BLD: 8.8 % (ref 4–15)
NEUTROPHILS # BLD AUTO: 2 K/UL (ref 1.8–7.7)
NEUTROPHILS NFR BLD: 39.2 % (ref 38–73)
NONHDLC SERPL-MCNC: 58 MG/DL
NRBC BLD-RTO: 0 /100 WBC
PLATELET # BLD AUTO: 200 K/UL (ref 150–450)
PMV BLD AUTO: 9.3 FL (ref 9.2–12.9)
POTASSIUM SERPL-SCNC: 4.2 MMOL/L (ref 3.5–5.1)
PROT SERPL-MCNC: 8.1 G/DL (ref 6–8.4)
RBC # BLD AUTO: 4.47 M/UL (ref 4–5.4)
SODIUM SERPL-SCNC: 138 MMOL/L (ref 136–145)
T4 FREE SERPL-MCNC: 0.99 NG/DL (ref 0.71–1.51)
TRIGL SERPL-MCNC: 83 MG/DL (ref 30–150)
TSH SERPL DL<=0.005 MIU/L-ACNC: 2.46 UIU/ML (ref 0.4–4)
WBC # BLD AUTO: 5.14 K/UL (ref 3.9–12.7)

## 2024-07-16 PROCEDURE — 80061 LIPID PANEL: CPT | Performed by: INTERNAL MEDICINE

## 2024-07-16 PROCEDURE — 82570 ASSAY OF URINE CREATININE: CPT | Performed by: INTERNAL MEDICINE

## 2024-07-16 PROCEDURE — 84439 ASSAY OF FREE THYROXINE: CPT | Performed by: INTERNAL MEDICINE

## 2024-07-16 PROCEDURE — 85025 COMPLETE CBC W/AUTO DIFF WBC: CPT | Performed by: INTERNAL MEDICINE

## 2024-07-16 PROCEDURE — 84443 ASSAY THYROID STIM HORMONE: CPT | Performed by: INTERNAL MEDICINE

## 2024-07-16 PROCEDURE — 80053 COMPREHEN METABOLIC PANEL: CPT | Performed by: INTERNAL MEDICINE

## 2024-07-16 PROCEDURE — 83036 HEMOGLOBIN GLYCOSYLATED A1C: CPT | Performed by: INTERNAL MEDICINE

## 2024-07-16 PROCEDURE — 36415 COLL VENOUS BLD VENIPUNCTURE: CPT | Performed by: INTERNAL MEDICINE

## 2024-07-16 PROCEDURE — 82043 UR ALBUMIN QUANTITATIVE: CPT | Performed by: INTERNAL MEDICINE

## 2024-10-31 PROBLEM — R01.1 HEART MURMUR: Status: ACTIVE | Noted: 2024-10-31

## 2024-10-31 PROBLEM — R21 RASH: Status: ACTIVE | Noted: 2024-10-31

## 2024-12-09 ENCOUNTER — HOSPITAL ENCOUNTER (EMERGENCY)
Facility: HOSPITAL | Age: 79
Discharge: HOME | End: 2024-12-09
Attending: EMERGENCY MEDICINE
Payer: MEDICARE

## 2024-12-09 VITALS
RESPIRATION RATE: 18 BRPM | HEIGHT: 64 IN | SYSTOLIC BLOOD PRESSURE: 116 MMHG | BODY MASS INDEX: 28.17 KG/M2 | HEART RATE: 84 BPM | WEIGHT: 165 LBS | DIASTOLIC BLOOD PRESSURE: 77 MMHG | TEMPERATURE: 99 F | OXYGEN SATURATION: 100 %

## 2024-12-09 DIAGNOSIS — M25.512 ACUTE PAIN OF LEFT SHOULDER: Primary | ICD-10-CM

## 2024-12-09 PROCEDURE — 25000003 PHARM REV CODE 250: Performed by: EMERGENCY MEDICINE

## 2024-12-09 PROCEDURE — 63600175 PHARM REV CODE 636 W HCPCS: Performed by: EMERGENCY MEDICINE

## 2024-12-09 PROCEDURE — 99284 EMERGENCY DEPT VISIT MOD MDM: CPT | Mod: 25

## 2024-12-09 PROCEDURE — 96372 THER/PROPH/DIAG INJ SC/IM: CPT | Performed by: EMERGENCY MEDICINE

## 2024-12-09 RX ORDER — KETOROLAC TROMETHAMINE 30 MG/ML
30 INJECTION, SOLUTION INTRAMUSCULAR; INTRAVENOUS
Status: COMPLETED | OUTPATIENT
Start: 2024-12-09 | End: 2024-12-09

## 2024-12-09 RX ORDER — METHOCARBAMOL 500 MG/1
1500 TABLET, FILM COATED ORAL
Status: COMPLETED | OUTPATIENT
Start: 2024-12-09 | End: 2024-12-09

## 2024-12-09 RX ORDER — MELOXICAM 15 MG/1
15 TABLET ORAL DAILY
Qty: 30 TABLET | Refills: 0 | Status: SHIPPED | OUTPATIENT
Start: 2024-12-09

## 2024-12-09 RX ORDER — DEXAMETHASONE SODIUM PHOSPHATE 4 MG/ML
4 INJECTION, SOLUTION INTRA-ARTICULAR; INTRALESIONAL; INTRAMUSCULAR; INTRAVENOUS; SOFT TISSUE
Status: COMPLETED | OUTPATIENT
Start: 2024-12-09 | End: 2024-12-09

## 2024-12-09 RX ADMIN — KETOROLAC TROMETHAMINE 30 MG: 30 INJECTION, SOLUTION INTRAMUSCULAR at 11:12

## 2024-12-09 RX ADMIN — METHOCARBAMOL 1500 MG: 500 TABLET ORAL at 11:12

## 2024-12-09 RX ADMIN — DEXAMETHASONE SODIUM PHOSPHATE 4 MG: 4 INJECTION, SOLUTION INTRA-ARTICULAR; INTRALESIONAL; INTRAMUSCULAR; INTRAVENOUS; SOFT TISSUE at 11:12

## 2024-12-09 NOTE — ED PROVIDER NOTES
"Encounter Date: 12/9/2024       History     Chief Complaint   Patient presents with    Shoulder Pain     Pt stated that she began experiencing left shoulder pain yesterday worsening today. 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  Pain is worse with any movement, range of motion is limited due to pain.  Denies swelling.  No chest pain or shortness of breath.  No nausea vomiting or diarrhea.  Not ill appearing, alert and oriented x4, GCS is 15      Review of patient's allergies indicates:   Allergen Reactions    Hydrocodone Swelling    Opioids - morphine analogues Swelling    Oxycodone Swelling     Past Medical History:   Diagnosis Date    Diabetes     Hyperlipidemia     Hypertension     Obesity     Postherpetic neuralgia      Past Surgical History:   Procedure Laterality Date    APPENDECTOMY      COLONOSCOPY  2015    LUMBAR SPINE SURGERY       Family History   Problem Relation Name Age of Onset    Hypertension Mother      Hyperlipidemia Mother      Diabetes Mother      Hypertension Father      Hyperlipidemia Father      Diabetes Father      Atrial fibrillation Sister      Breast cancer Sister       Social History     Tobacco Use    Smoking status: Never     Passive exposure: Never    Smokeless tobacco: Never     Review of Systems   Constitutional:  Negative for fever.   HENT:  Negative for sore throat.    Respiratory:  Negative for shortness of breath.    Cardiovascular:  Negative for chest pain.   Gastrointestinal:  Negative for nausea.   Genitourinary:  Negative for dysuria.   Musculoskeletal:  Positive for arthralgias. Negative for back pain.   Skin:  Negative for rash.   Neurological:  Negative for weakness.   Hematological:  Does not bruise/bleed easily.   All other systems reviewed and are " negative.      Physical Exam     Initial Vitals   BP Pulse Resp Temp SpO2   12/09/24 1101 12/09/24 1101 12/09/24 1101 12/09/24 1100 12/09/24 1101   116/77 84 18 98.6 °F (37 °C) 100 %      MAP       --                Physical Exam    Nursing note and vitals reviewed.  Constitutional: She appears well-developed and well-nourished. She is not diaphoretic. No distress.   HENT:   Head: Normocephalic and atraumatic.   Eyes: Conjunctivae and EOM are normal. Pupils are equal, round, and reactive to light.   Neck: Neck supple.   Normal range of motion.  Cardiovascular:  Normal rate, regular rhythm, normal heart sounds and intact distal pulses.           No murmur heard.  Pulmonary/Chest: Breath sounds normal. No respiratory distress. She has no wheezes. She has no rhonchi. She has no rales. She exhibits no tenderness.   Abdominal: Abdomen is soft. Bowel sounds are normal.   Musculoskeletal:         General: Tenderness present. No edema.      Cervical back: Normal range of motion and neck supple.      Comments: Range of motion of left shoulder limited due to pain, neurovascularly intact throughout, strong distal pulses, tenderness elicited with abduction of left shoulder, extension a left shoulder, flexion of left shoulder     Neurological: She is alert and oriented to person, place, and time. She has normal strength. No cranial nerve deficit. GCS score is 15. GCS eye subscore is 4. GCS verbal subscore is 5. GCS motor subscore is 6.   Skin: Skin is warm and dry. Capillary refill takes less than 2 seconds.         ED Course   Procedures  Labs Reviewed - No data to display       Imaging Results    None          Medications   ketorolac injection 30 mg (has no administration in time range)   dexAMETHasone injection 4 mg (has no administration in time range)   methocarbamoL tablet 1,500 mg (has no administration in time range)     Medical Decision Making                        Medical Decision Making:   Differential Diagnosis:    Bursitis, left shoulder pain, arthritis             Clinical Impression:  Final diagnoses:  [M25.512] Acute pain of left shoulder (Primary)          ED Disposition Condition    Discharge Stable          ED Prescriptions       Medication Sig Dispense Start Date End Date Auth. Provider    meloxicam (MOBIC) 15 MG tablet Take 1 tablet (15 mg total) by mouth once daily. 30 tablet 12/9/2024 -- Edison Larkin MD          Follow-up Information       Follow up With Specialties Details Why Contact Info Additional Information    Primary care physician  In 2 days       [place] - Emergency Department Emergency Medicine  As needed, If symptoms worsen [address] [facility] [id number] [phone] Floor 1             Edison Larkin MD  12/09/24 1115

## 2025-01-27 PROBLEM — N18.31 CHRONIC KIDNEY DISEASE, STAGE 3A: Status: ACTIVE | Noted: 2025-01-27

## 2025-03-05 PROBLEM — E11.9 DIABETES MELLITUS WITHOUT COMPLICATION: Status: RESOLVED | Noted: 2021-02-08 | Resolved: 2025-03-05

## 2025-05-16 ENCOUNTER — TELEPHONE (OUTPATIENT)
Dept: PRIMARY CARE CLINIC | Facility: CLINIC | Age: 80
End: 2025-05-16
Payer: MEDICARE

## 2025-05-16 DIAGNOSIS — K04.7 PERIAPICAL ABSCESS: Primary | ICD-10-CM

## 2025-05-16 RX ORDER — CLINDAMYCIN HYDROCHLORIDE 300 MG/1
300 CAPSULE ORAL EVERY 8 HOURS
Qty: 21 CAPSULE | Refills: 0 | Status: SHIPPED | OUTPATIENT
Start: 2025-05-16 | End: 2025-05-23

## 2025-06-25 ENCOUNTER — HOSPITAL ENCOUNTER (EMERGENCY)
Facility: HOSPITAL | Age: 80
Discharge: HOME | End: 2025-06-25
Attending: EMERGENCY MEDICINE
Payer: MEDICARE

## 2025-06-25 VITALS
HEART RATE: 67 BPM | WEIGHT: 158.75 LBS | RESPIRATION RATE: 16 BRPM | DIASTOLIC BLOOD PRESSURE: 70 MMHG | OXYGEN SATURATION: 98 % | SYSTOLIC BLOOD PRESSURE: 137 MMHG | TEMPERATURE: 98 F | HEIGHT: 64 IN | BODY MASS INDEX: 27.1 KG/M2

## 2025-06-25 DIAGNOSIS — H43.392 FLOATERS IN VISUAL FIELD, LEFT: Primary | ICD-10-CM

## 2025-06-25 LAB — POCT GLUCOSE: 103 MG/DL (ref 70–110)

## 2025-06-25 PROCEDURE — 82962 GLUCOSE BLOOD TEST: CPT

## 2025-06-25 PROCEDURE — 99284 EMERGENCY DEPT VISIT MOD MDM: CPT

## 2025-06-25 RX ORDER — TIRZEPATIDE 12.5 MG/.5ML
12.5 INJECTION, SOLUTION SUBCUTANEOUS
COMMUNITY
Start: 2025-04-11

## 2025-06-25 NOTE — ED PROVIDER NOTES
"EMERGENCY DEPARTMENT HISTORY AND PHYSICAL EXAM     This note is dictated on [employer] word recognition program.  There are word recognition mistakes and grammatical errors that are occasionally missed on review.     Date: 6/25/2025   Patient Name: Jaki Bermudez       History of Presenting Illness      Chief Complaint   Patient presents with    Eye Problem     Pt states she normally has floaters in both eyes and today while at work they got worse and it's like "a black sheet" in left eye.  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 Denies headache, recent illness, n/v.           Jaki Bermudez is a 79 y.o. female with PMHX of diabetes, hypertension, hyperlipidemia who presents to the emergency department C/O acute vision loss.    Patient works here as cook and at work shortly prior to arrival had sudden vision change.  Patient describes this as seen in increase of floaters around the lateral aspect of her left eye.  Started off as dark floaters that merged together and became wavy like a sheet with sharply defined borders.  She denies pain.  States she gets floaters occasionally but never to this extent.  No history of migraines.  No history of eye surgery.  She is on ASA.    PCP: Jung Manjarrez Jr., MD      Current Medications[1]        Past History     Past Medical History:   Past Medical History:   Diagnosis Date    Diabetes     Hyperlipidemia     Hypertension     Obesity     Postherpetic neuralgia         Past Surgical History:   Past Surgical History:   Procedure Laterality Date    APPENDECTOMY      COLONOSCOPY  2015    LUMBAR SPINE SURGERY          Family History:   Family History   Problem Relation Name Age of Onset    Hypertension Mother      Hyperlipidemia Mother      Diabetes Mother      Hypertension Father      Hyperlipidemia Father      Diabetes Father      Atrial fibrillation Sister      Breast cancer Sister          Social History:   Social History[2]     Allergies:   Review of " "patient's allergies indicates:   Allergen Reactions    Hydrocodone Swelling    Opioids - morphine analogues Swelling    Oxycodone Swelling          Review of Systems   Review of Systems   See HPI for pertinent positives and negatives       Physical Exam     Vitals:    06/25/25 0958   BP: (!) 160/77   Pulse: 86   Resp: 18   Temp: 98.5 °F (36.9 °C)   TempSrc: Oral   SpO2: 100%   Weight: 72 kg (158 lb 11.7 oz)   Height: 5' 4" (1.626 m)      Physical Exam  Vitals and nursing note reviewed.   Constitutional:       General: She is not in acute distress.     Appearance: Normal appearance. She is not ill-appearing.   HENT:      Head: Normocephalic and atraumatic.      Right Ear: External ear normal.      Left Ear: External ear normal.      Nose: Nose normal. No congestion or rhinorrhea.      Mouth/Throat:      Mouth: Mucous membranes are moist.   Eyes:      General: Lids are normal.      Extraocular Movements: Extraocular movements intact.      Conjunctiva/sclera: Conjunctivae normal.      Right eye: Right conjunctiva is not injected.      Left eye: Left conjunctiva is not injected.      Pupils: Pupils are equal, round, and reactive to light.      Visual Fields: Right eye visual fields normal and left eye visual fields normal.   Pulmonary:      Effort: Pulmonary effort is normal. No respiratory distress.   Musculoskeletal:         General: No deformity. Normal range of motion.      Cervical back: Normal range of motion. No rigidity.   Skin:     General: Skin is dry.   Neurological:      General: No focal deficit present.      Mental Status: She is alert and oriented to person, place, and time. Mental status is at baseline.   Psychiatric:         Mood and Affect: Mood normal.         Behavior: Behavior normal.              Diagnostic Study Results      Labs -   Recent Results (from the past 12 hours)   POCT glucose    Collection Time: 06/25/25 10:04 AM   Result Value Ref Range    POCT Glucose 103 70 - 110 mg/dL    "     Radiologic Studies -    No orders to display        Medications given in the ED-   Medications - No data to display        Medical Decision Making    I am the first provider for this patient.     I reviewed the vital signs, available nursing notes, past medical history, past surgical history, family history and social history.     Vital Signs:  Reviewed the patient's vital signs.     Pulse Oximetry Analysis and Interpretation:    100% on Room Air, normal        External Test Results (Pertinent to encounter):    Records Reviewed: Nursing Notes    History Obtained By: Patient    Provider Notes: Jaki Bermudez is a 79 y.o. female with painless vision loss    Co-morbidities Considered: age, dm    Differential Diagnosis:  Retinal detattachment, vitreous hemorrhage, posterior vitreous detachment, ocular migraine      ED Course:    CONSULT NOTE:    10:34 AM      Dr. Magana discussed care with?Dorcas Gonzalez   It was a standard discussion,?including history of patients chief complaint, available diagnostic results, and treatment course.?Discussed case. We will plan on sending from ED to optho clinic.      10:34 AM  After ultrasounding patient she reports improvement in her floaters                Problems Addressed:  Vision loss    Procedures:   ED US Eye    Date/Time: 6/25/2025 10:26 AM    Performed by: Rashad Magana MD  Authorized by: Rashad Magana MD    Indication:  Vision change  Identified structures:  Left  Vitreous body:  Hyperechoic density (subtle)  Optic nerve sheath:  Normal  Other:  There is subtle hyper echoic lying near optic nerve but no findings suggestive of lucho retinal detachmebt  Charge?:  Yes         Diagnosis and Disposition     Critical Care:           CLINICAL IMPRESSION:         1. Floaters in visual field, left              PLAN:   1. Discharge Home  2.      Medication List        ASK your doctor about these medications      amLODIPine 5 MG tablet  Commonly known  as: NORVASC  Take 1 tablet by mouth once daily     aspirin 81 MG EC tablet  Commonly known as: ECOTRIN     MOUNJARO 12.5 mg/0.5 mL Pnij  Generic drug: tirzepatide     olmesartan 40 MG tablet  Commonly known as: BENICAR  Take 1 tablet by mouth once daily     rosuvastatin 5 MG tablet  Commonly known as: CRESTOR  Take 1 tablet by mouth once daily     traMADoL 50 mg tablet  Commonly known as: ULTRAM             3. No follow-up provider specified.     _______________________________     Please note that this dictation was completed with [employer] the [employer] voice recognition software.  Quite often unanticipated grammatical, syntax, homophones, and other interpretive errors are inadvertently transcribed by the computer software.  Please disregard these errors.  Please excuse any errors that have escaped final proofreading.               [1]   No current facility-administered medications for this encounter.     Current Outpatient Medications   Medication Sig Dispense Refill    amLODIPine (NORVASC) 5 MG tablet Take 1 tablet by mouth once daily 90 tablet 2    aspirin (ECOTRIN) 81 MG EC tablet Take 81 mg by mouth once daily.      MOUNJARO 12.5 mg/0.5 mL PnIj 12.5 mg.      olmesartan (BENICAR) 40 MG tablet Take 1 tablet by mouth once daily 90 tablet 2    rosuvastatin (CRESTOR) 5 MG tablet Take 1 tablet by mouth once daily 90 tablet 2    traMADoL (ULTRAM) 50 mg tablet Take 50 mg by mouth every 6 (six) hours as needed for Pain. Prescribed by Dr. Ballesteros     [2]   Social History  Tobacco Use    Smoking status: Never     Passive exposure: Never    Smokeless tobacco: Never        Rashad Magana MD  06/25/25 5078

## 2025-07-03 RX ORDER — SILVER SULFADIAZINE 10 G/1000G
CREAM TOPICAL 2 TIMES DAILY
Qty: 85 G | Refills: 3 | Status: SHIPPED | OUTPATIENT
Start: 2025-07-03

## 2025-08-29 ENCOUNTER — TELEPHONE (OUTPATIENT)
Dept: PRIMARY CARE CLINIC | Facility: CLINIC | Age: 80
End: 2025-08-29
Payer: MEDICARE

## 2025-08-29 DIAGNOSIS — M75.50 ACUTE SHOULDER BURSITIS, UNSPECIFIED LATERALITY: Primary | ICD-10-CM

## 2025-08-29 RX ORDER — TRAMADOL HYDROCHLORIDE 50 MG/1
50 TABLET, FILM COATED ORAL EVERY 6 HOURS PRN
Qty: 12 TABLET | Refills: 0 | Status: SHIPPED | OUTPATIENT
Start: 2025-08-29